# Patient Record
Sex: MALE | Race: BLACK OR AFRICAN AMERICAN | URBAN - METROPOLITAN AREA
[De-identification: names, ages, dates, MRNs, and addresses within clinical notes are randomized per-mention and may not be internally consistent; named-entity substitution may affect disease eponyms.]

---

## 2017-02-01 ENCOUNTER — APPOINTMENT (OUTPATIENT)
Dept: CT IMAGING | Facility: CLINIC | Age: 27
End: 2017-02-01
Attending: FAMILY MEDICINE
Payer: COMMERCIAL

## 2017-02-01 ENCOUNTER — HOSPITAL ENCOUNTER (EMERGENCY)
Facility: CLINIC | Age: 27
Discharge: HOME | End: 2017-02-01
Attending: FAMILY MEDICINE | Admitting: FAMILY MEDICINE
Payer: COMMERCIAL

## 2017-02-01 VITALS
OXYGEN SATURATION: 98 % | SYSTOLIC BLOOD PRESSURE: 116 MMHG | RESPIRATION RATE: 20 BRPM | HEART RATE: 58 BPM | TEMPERATURE: 97.8 F | DIASTOLIC BLOOD PRESSURE: 76 MMHG

## 2017-02-01 DIAGNOSIS — R10.11 ABDOMINAL PAIN, RIGHT UPPER QUADRANT: ICD-10-CM

## 2017-02-01 DIAGNOSIS — K52.9 ILEITIS: ICD-10-CM

## 2017-02-01 LAB
ALBUMIN SERPL-MCNC: 3.9 G/DL (ref 3.4–5)
ALBUMIN UR-MCNC: NEGATIVE MG/DL
ALP SERPL-CCNC: 50 U/L (ref 40–150)
ALT SERPL W P-5'-P-CCNC: 17 U/L (ref 0–70)
ANION GAP SERPL CALCULATED.3IONS-SCNC: 6 MMOL/L (ref 3–14)
APPEARANCE UR: CLEAR
AST SERPL W P-5'-P-CCNC: 13 U/L (ref 0–45)
BASOPHILS # BLD AUTO: 0.1 10E9/L (ref 0–0.2)
BASOPHILS NFR BLD AUTO: 1.1 %
BILIRUB SERPL-MCNC: 1.3 MG/DL (ref 0.2–1.3)
BILIRUB UR QL STRIP: NEGATIVE
BUN SERPL-MCNC: 19 MG/DL (ref 7–30)
CALCIUM SERPL-MCNC: 8.6 MG/DL (ref 8.5–10.1)
CHLORIDE SERPL-SCNC: 108 MMOL/L (ref 94–109)
CO2 SERPL-SCNC: 27 MMOL/L (ref 20–32)
COLOR UR AUTO: YELLOW
CREAT SERPL-MCNC: 0.6 MG/DL (ref 0.66–1.25)
DIFFERENTIAL METHOD BLD: ABNORMAL
EOSINOPHIL # BLD AUTO: 1.5 10E9/L (ref 0–0.7)
EOSINOPHIL NFR BLD AUTO: 24.1 %
ERYTHROCYTE [DISTWIDTH] IN BLOOD BY AUTOMATED COUNT: 12 % (ref 10–15)
GFR SERPL CREATININE-BSD FRML MDRD: ABNORMAL ML/MIN/1.7M2
GLUCOSE SERPL-MCNC: 90 MG/DL (ref 70–99)
GLUCOSE UR STRIP-MCNC: NEGATIVE MG/DL
HCT VFR BLD AUTO: 46 % (ref 40–53)
HGB BLD-MCNC: 16.7 G/DL (ref 13.3–17.7)
HGB UR QL STRIP: NEGATIVE
IMM GRANULOCYTES # BLD: 0 10E9/L (ref 0–0.4)
IMM GRANULOCYTES NFR BLD: 0 %
KETONES UR STRIP-MCNC: NEGATIVE MG/DL
LEUKOCYTE ESTERASE UR QL STRIP: NEGATIVE
LIPASE SERPL-CCNC: 101 U/L (ref 73–393)
LYMPHOCYTES # BLD AUTO: 1.5 10E9/L (ref 0.8–5.3)
LYMPHOCYTES NFR BLD AUTO: 23.8 %
MCH RBC QN AUTO: 32.7 PG (ref 26.5–33)
MCHC RBC AUTO-ENTMCNC: 36.3 G/DL (ref 31.5–36.5)
MCV RBC AUTO: 90 FL (ref 78–100)
MONOCYTES # BLD AUTO: 0.4 10E9/L (ref 0–1.3)
MONOCYTES NFR BLD AUTO: 7 %
MUCOUS THREADS #/AREA URNS LPF: PRESENT /LPF
NEUTROPHILS # BLD AUTO: 2.8 10E9/L (ref 1.6–8.3)
NEUTROPHILS NFR BLD AUTO: 44 %
NITRATE UR QL: NEGATIVE
NRBC # BLD AUTO: 0 10*3/UL
NRBC BLD AUTO-RTO: 0 /100
PH UR STRIP: 7 PH (ref 5–7)
PLATELET # BLD AUTO: 204 10E9/L (ref 150–450)
POTASSIUM SERPL-SCNC: 3.7 MMOL/L (ref 3.4–5.3)
PROT SERPL-MCNC: 7.2 G/DL (ref 6.8–8.8)
RBC # BLD AUTO: 5.11 10E12/L (ref 4.4–5.9)
RBC #/AREA URNS AUTO: 1 /HPF (ref 0–2)
SODIUM SERPL-SCNC: 141 MMOL/L (ref 133–144)
SP GR UR STRIP: 1.02 (ref 1–1.03)
URN SPEC COLLECT METH UR: ABNORMAL
UROBILINOGEN UR STRIP-MCNC: NORMAL MG/DL (ref 0–2)
WBC # BLD AUTO: 6.3 10E9/L (ref 4–11)
WBC #/AREA URNS AUTO: <1 /HPF (ref 0–2)

## 2017-02-01 PROCEDURE — 25000128 H RX IP 250 OP 636: Performed by: FAMILY MEDICINE

## 2017-02-01 PROCEDURE — 74176 CT ABD & PELVIS W/O CONTRAST: CPT

## 2017-02-01 PROCEDURE — 96374 THER/PROPH/DIAG INJ IV PUSH: CPT | Mod: 59 | Performed by: FAMILY MEDICINE

## 2017-02-01 PROCEDURE — 99284 EMERGENCY DEPT VISIT MOD MDM: CPT | Mod: 25 | Performed by: FAMILY MEDICINE

## 2017-02-01 PROCEDURE — 99284 EMERGENCY DEPT VISIT MOD MDM: CPT | Mod: Z6 | Performed by: FAMILY MEDICINE

## 2017-02-01 PROCEDURE — 85025 COMPLETE CBC W/AUTO DIFF WBC: CPT | Performed by: FAMILY MEDICINE

## 2017-02-01 PROCEDURE — 80053 COMPREHEN METABOLIC PANEL: CPT | Performed by: FAMILY MEDICINE

## 2017-02-01 PROCEDURE — 81001 URINALYSIS AUTO W/SCOPE: CPT | Performed by: FAMILY MEDICINE

## 2017-02-01 PROCEDURE — 83690 ASSAY OF LIPASE: CPT | Performed by: FAMILY MEDICINE

## 2017-02-01 RX ORDER — NAPROXEN 500 MG/1
500 TABLET ORAL 2 TIMES DAILY WITH MEALS
Qty: 16 TABLET | Refills: 0 | Status: SHIPPED | OUTPATIENT
Start: 2017-02-01 | End: 2017-02-09

## 2017-02-01 RX ORDER — POLYETHYLENE GLYCOL 3350 17 G/17G
1 POWDER, FOR SOLUTION ORAL DAILY
Qty: 527 G | Refills: 0 | Status: SHIPPED | OUTPATIENT
Start: 2017-02-01 | End: 2017-03-03

## 2017-02-01 RX ORDER — KETOROLAC TROMETHAMINE 30 MG/ML
30 INJECTION, SOLUTION INTRAMUSCULAR; INTRAVENOUS ONCE
Status: COMPLETED | OUTPATIENT
Start: 2017-02-01 | End: 2017-02-01

## 2017-02-01 RX ADMIN — KETOROLAC TROMETHAMINE 30 MG: 30 INJECTION, SOLUTION INTRAMUSCULAR at 09:43

## 2017-02-01 NOTE — ED AVS SNAPSHOT
[facility], Emergency Department    [address]    [facility] [place] [address]    Phone:  [phone]    Fax:  [phone]                                       Palmira Chavis   MRN: [id number]    Department:  [facility], Emergency Department   Date of Visit:  2/1/2017           After Visit Summary Signature Page     I have received my discharge instructions, and my questions have been answered. I have discussed any challenges I see with this plan with the nurse or doctor.    ..........................................................................................................................................  Patient/Patient Representative Signature      ..........................................................................................................................................  Patient Representative Print Name and Relationship to Patient    ..................................................               ................................................  Date                                            Time    ..........................................................................................................................................  Reviewed by Signature/Title    ...................................................              ..............................................  Date                                                            Time

## 2017-02-01 NOTE — ED AVS SNAPSHOT
[facility], Emergency Department    [address]    [place] [address]    Phone:  [phone]    Fax:  [phone]                                       Palmira Chavis   MRN: [id number]    Department:  [facility], Emergency Department   Date of Visit:  2/1/2017           Patient Information     Date Of Birth          1990        Your diagnoses for this visit were:     Abdominal pain, right upper quadrant     Ileitis        You were seen by Marc Leal MD.        Discharge Instructions       Thank you for choosing [facility].     Please closely monitor for further symptoms. Return to the Emergency Department if you develop any new or worsening signs or symptoms.    If you received any opiate pain medications or sedatives during your visit, please do not drive for at least 8 hours.     Labs, cultures or final xray interpretations may still need to be reviewed.  We will call you if your plan of care needs to be changed.    P    Abdominal Pain  Abdominal pain is pain in the stomach or intestinal area. Everyone has this pain from time to time. In many cases it goes away on its own. But abdominal pain can sometimes be due to a serious problem, such as appendicitis. So it s important to know when to seek help.  Causes of abdominal pain  There are many possible causes of abdominal pain. Common causes in adults include:    Constipation, diarrhea, or gas    GERD (gastroesophageal reflux disease) movement of stomach acid into the esophagus, also known as acid reflux or heartburn    Peptic ulcer (a sore in the lining of the stomach or small intestine)    Inflammation of the gallbladder, liver, or pancreas    Gallstones or kidney stones    Appendicitis     Obstruction of the intestines     Hernia (bulging of an internal organ through a muscle or other tissue)    Urinary tract infections    In women, menstrual cramps, fibroids, or endometriosis of the uterus    Inflammation  or infection of the intestines  Diagnosing the cause of abdominal pain  Your health care provider will examine you to help find the cause of your pain. If needed, tests will be ordered. Because abdominal pain has so many possible causes, it can be hard to discover the reason for the pain. Giving details about your pain can help. Be ready to tell your health care provider where and when you feel the pain and what makes it better or worse. Also mention whether you have other symptoms such as fever, tiredness, nausea, vomiting, or changes in bathroom habits.  Treating abdominal pain  Certain causes of pain, such as appendicitis or a bowel obstruction, need emergency treatment. Other problems can be treated with rest, fluids, or medications. Your health care provider can give you specific instructions for treatment or self-care based on the cause of your pain.  If you have vomiting or diarrhea, sip water or other clear fluids. When you are ready to eat solid foods again, start with small amounts of easy-to-digest, low-fat foods, such as applesauce, toast, or crackers.   When to call the doctor  Call 911 or go to the hospital right away if you:    Can t pass stool and are vomiting    Are vomiting blood or have black, tarry diarrhea    Also have chest, neck, or shoulder pain    Feel like you are about to pass out    Have pain in your shoulder blades with nausea    Have sudden, excruciating abdominal pain    Have new, severe pain unlike any you have felt before    Have a belly that is rigid, hard, and tender to touch  Call your doctor if you have:    Pain for more than 5 days    Bloating for more than 2 days    Diarrhea for more than 5 days    Fever of 101 F (38.3 C) or higher    Pain that continues to worsen    Unexplained weight loss    Continued lack of appetite    Blood in the stool  How to prevent abdominal pain  Here are some tips to help prevent abdominal pain:    Eat smaller amounts of food at one time.    Avoid  greasy, fried, or other high-fat foods.    Avoid foods that give you gas.    Exercise regularly.    Drink plenty of fluids.  To help prevent symptoms of gastroesophageal reflux disease (GERD):    Quit smoking.    Reduce alcohol and certain foods that increase stomach acid.     Lose excess weight.    Finish eating at least 2 hours before you go to bed or lie down.    Elevate the head of your bed.    8602-2544 [employer]. [address] [place] [address], [place] [address]. All rights reserved. This information is not intended as a substitute for professional medical care. Always follow your healthcare professional's instructions.      lucian make an appointment to follow up with [facility] (phone: [phone]) in 5-7 days if not resolved.    24 Hour Appointment Hotline       To make an appointment at any [facility], call [phone] ([phone]). If you don't have a family doctor or clinic, we will help you find one. [facility] clinics are conveniently located to serve the needs of you and your family.             Review of your medicines      START taking        Dose / Directions Last dose taken    naproxen 500 MG tablet   Commonly known as:  NAPROSYN   Dose:  500 mg   Quantity:  16 tablet        Take 1 tablet (500 mg) by mouth 2 times daily (with meals) for 8 days   Refills:  0        polyethylene glycol powder   Commonly known as:  MIRALAX   Dose:  1 capful   Quantity:  527 g        Take 17 g (1 capful) by mouth daily   Refills:  0          Our records show that you are taking the medicines listed below. If these are incorrect, please call your family doctor or clinic.        Dose / Directions Last dose taken    acetaminophen 325 MG tablet   Commonly known as:  TYLENOL   Dose:  650 mg   Quantity:  100 tablet        Take 2 tablets (650 mg) by mouth every 4 hours as needed for mild pain   Refills:  0        docusate sodium 100 MG tablet   Commonly known as:  COLACE   Dose:  100  "mg   Quantity:  60 tablet        Take 100 mg by mouth 2 times daily Stop taking if diarrhea develops   Refills:  1                Prescriptions were sent or printed at these locations (2 Prescriptions)                   Other Prescriptions                Printed at Department/Unit printer (2 of 2)         naproxen (NAPROSYN) 500 MG tablet               polyethylene glycol (MIRALAX) powder                Procedures and tests performed during your visit     CBC with platelets differential    CT Abdomen Pelvis w/o Contrast    Comprehensive metabolic panel    Lipase    UA with Microscopic reflex to Culture      Orders Needing Specimen Collection     None      Pending Results     No orders found from 2017 to 2017.            Pending Culture Results     No orders found from 2017 to 2017.            Thank you for choosing [facility]       Thank you for choosing [facility] for your care. Our goal is always to provide you with excellent care. Hearing back from our patients is one way we can continue to improve our services. Please take a few minutes to complete the written survey that you may receive in the mail after you visit with us. Thank you!        [employer] Information     [employer] lets you send messages to your doctor, view your test results, renew your prescriptions, schedule appointments and more. To sign up, go to [email or web address] . Click on \"Log in\" on the left side of the screen, which will take you to the Welcome page. Then click on \"Sign up Now\" on the right side of the page.     You will be asked to enter the access code listed below, as well as some personal information. Please follow the directions to create your username and password.     Your access code is: [email or web address]  Expires: 2017 11:42 AM     Your access code will  in 90 days. If you need help or a new code, please call your [facility] clinic or [phone].        Care EveryWhere ID     This is your Care EveryWhere ID. " This could be used by other organizations to access your [facility] medical records  [id number]        After Visit Summary       This is your record. Keep this with you and show to your community pharmacist(s) and doctor(s) at your next visit.

## 2017-02-01 NOTE — DISCHARGE INSTRUCTIONS
Thank you for choosing [facility].     Please closely monitor for further symptoms. Return to the Emergency Department if you develop any new or worsening signs or symptoms.    If you received any opiate pain medications or sedatives during your visit, please do not drive for at least 8 hours.     Labs, cultures or final xray interpretations may still need to be reviewed.  We will call you if your plan of care needs to be changed.    P    Abdominal Pain  Abdominal pain is pain in the stomach or intestinal area. Everyone has this pain from time to time. In many cases it goes away on its own. But abdominal pain can sometimes be due to a serious problem, such as appendicitis. So it s important to know when to seek help.  Causes of abdominal pain  There are many possible causes of abdominal pain. Common causes in adults include:    Constipation, diarrhea, or gas    GERD (gastroesophageal reflux disease) movement of stomach acid into the esophagus, also known as acid reflux or heartburn    Peptic ulcer (a sore in the lining of the stomach or small intestine)    Inflammation of the gallbladder, liver, or pancreas    Gallstones or kidney stones    Appendicitis     Obstruction of the intestines     Hernia (bulging of an internal organ through a muscle or other tissue)    Urinary tract infections    In women, menstrual cramps, fibroids, or endometriosis of the uterus    Inflammation or infection of the intestines  Diagnosing the cause of abdominal pain  Your health care provider will examine you to help find the cause of your pain. If needed, tests will be ordered. Because abdominal pain has so many possible causes, it can be hard to discover the reason for the pain. Giving details about your pain can help. Be ready to tell your health care provider where and when you feel the pain and what makes it better or worse. Also mention whether you have other symptoms such as fever, tiredness, nausea,  vomiting, or changes in bathroom habits.  Treating abdominal pain  Certain causes of pain, such as appendicitis or a bowel obstruction, need emergency treatment. Other problems can be treated with rest, fluids, or medications. Your health care provider can give you specific instructions for treatment or self-care based on the cause of your pain.  If you have vomiting or diarrhea, sip water or other clear fluids. When you are ready to eat solid foods again, start with small amounts of easy-to-digest, low-fat foods, such as applesauce, toast, or crackers.   When to call the doctor  Call 911 or go to the hospital right away if you:    Can t pass stool and are vomiting    Are vomiting blood or have black, tarry diarrhea    Also have chest, neck, or shoulder pain    Feel like you are about to pass out    Have pain in your shoulder blades with nausea    Have sudden, excruciating abdominal pain    Have new, severe pain unlike any you have felt before    Have a belly that is rigid, hard, and tender to touch  Call your doctor if you have:    Pain for more than 5 days    Bloating for more than 2 days    Diarrhea for more than 5 days    Fever of 101 F (38.3 C) or higher    Pain that continues to worsen    Unexplained weight loss    Continued lack of appetite    Blood in the stool  How to prevent abdominal pain  Here are some tips to help prevent abdominal pain:    Eat smaller amounts of food at one time.    Avoid greasy, fried, or other high-fat foods.    Avoid foods that give you gas.    Exercise regularly.    Drink plenty of fluids.  To help prevent symptoms of gastroesophageal reflux disease (GERD):    Quit smoking.    Reduce alcohol and certain foods that increase stomach acid.     Lose excess weight.    Finish eating at least 2 hours before you go to bed or lie down.    Elevate the head of your bed.    2924-3625 [employer]. [address] [place] [address], [place] [address]. All rights reserved. This information is  not intended as a substitute for professional medical care. Always follow your healthcare professional's instructions.      lucian make an appointment to follow up with [facility] (phone: [phone]) in 5-7 days if not resolved.

## 2017-02-01 NOTE — ED NOTES
Pt complaining of right upper quadrant crushing pain that is not relieve by first aide medication given to him by his friends yesterday. Pt unable to recall the name of the medication.

## 2017-02-01 NOTE — ED PROVIDER NOTES
History     Chief Complaint   Patient presents with     Abdominal Pain     C/oright upper quadrant abdominal pain that started around 6 pm yesterday.     JARROD Chavis is a 26 year old male who presents with right-sided abdominal pain.  Patient states last night around 6 PM he developed a sharp intermittent pain in the right upper abdomen.  The pain is nonradiating.  Nothing makes the pain better or worse.  There is no associated nausea, or vomiting.  No diarrhea or constipation.  No urinary symptoms, no groin pain, no testicular pain.  At this moment he does not have the pain.  No fever or chills.  He is status post appendectomy last year.  No postoperative problems.    I have reviewed the Medications, Allergies, Past Medical and Surgical History, and Social History in the Epic system.    Review of Systems  All other systems reviewed and were negative    Physical Exam   BP: 110/72 mmHg  Pulse: 58  Temp: 97.8  F (36.6  C)  Resp: 16  SpO2: 100 %  Physical Exam   Constitutional: He is oriented to person, place, and time. No distress.   HENT:   Head: Atraumatic.   Mouth/Throat: Oropharynx is clear and moist. No oropharyngeal exudate.   Eyes: Pupils are equal, round, and reactive to light. No scleral icterus.   Neck: Neck supple.   Cardiovascular: Normal heart sounds and intact distal pulses.    Pulmonary/Chest: Breath sounds normal. No respiratory distress.   Abdominal: Soft. Bowel sounds are normal. He exhibits no mass. There is no tenderness. There is no rebound and no guarding.   Musculoskeletal: He exhibits no edema or tenderness.   Neurological: He is alert and oriented to person, place, and time.   Skin: Skin is warm. No rash noted. He is not diaphoretic.       ED Course     Procedures             Critical Care time:  none               Labs Ordered and Resulted from Time of ED Arrival Up to the Time of Departure from the ED   CBC WITH PLATELETS DIFFERENTIAL - Abnormal; Notable for the following:      Absolute Eosinophils 1.5 (*)     All other components within normal limits   COMPREHENSIVE METABOLIC PANEL - Abnormal; Notable for the following:     Creatinine 0.60 (*)     All other components within normal limits   ROUTINE UA WITH MICROSCOPIC REFLEX TO CULTURE - Abnormal; Notable for the following:     Mucous Urine Present (*)     All other components within normal limits   LIPASE       Assessments & Plan (with Medical Decision Making)   Patient with no medical history other than prior appendectomy presenting with right-sided abdominal pain.  Initial differential diagnosis included but was not restricted to cholelithiasis or cholecystitis, pancreatitis, ischemic bowel, diverticulitis, ureterolithiasis,small bowel obstruction, pyelonephritis, abdominal compartment syndrome, referred pain as well as other life threatening and non-life-threatening  causes of right lower abdominal pain.he has a nonsurgical exam and labs but complained of persistent right-sided tenderness.  Ultimately a CT was obtained which showed question of possible ileitis.  He has no known history of inflammatory bowel disease and certainly has no associated diarrhea or hematochezia.  His symptoms did improve with Toradol, and he now is only minimal tenderness.  I advised him regarding his CT findings and the need for follow-up.  He would like to be discharged and understands indications for return.      I have reviewed the nursing notes.    I have reviewed the findings, diagnosis, plan and need for follow up with the patient.    Discharge Medication List as of 2/1/2017 11:42 AM      START taking these medications    Details   naproxen (NAPROSYN) 500 MG tablet Take 1 tablet (500 mg) by mouth 2 times daily (with meals) for 8 days, Disp-16 tablet, R-0, Local Print      polyethylene glycol (MIRALAX) powder Take 17 g (1 capful) by mouth daily, Disp-527 g, R-0, Local Print             Final diagnoses:   Abdominal pain, right upper quadrant   Ileitis        2/1/2017   [facility], [place], EMERGENCY DEPARTMENT      Marc Leal MD  02/01/17 [id number]

## 2017-12-12 ENCOUNTER — OFFICE VISIT (OUTPATIENT)
Dept: FAMILY MEDICINE | Facility: CLINIC | Age: 27
End: 2017-12-12

## 2017-12-12 VITALS
OXYGEN SATURATION: 99 % | HEART RATE: 60 BPM | DIASTOLIC BLOOD PRESSURE: 71 MMHG | SYSTOLIC BLOOD PRESSURE: 107 MMHG | RESPIRATION RATE: 18 BRPM | HEIGHT: 67 IN | TEMPERATURE: 97.4 F | BODY MASS INDEX: 20.72 KG/M2 | WEIGHT: 132 LBS

## 2017-12-12 DIAGNOSIS — K06.8 BLEEDING GUMS: ICD-10-CM

## 2017-12-12 DIAGNOSIS — Z71.84 COUNSELING FOR TRAVEL: ICD-10-CM

## 2017-12-12 DIAGNOSIS — J18.9 PNEUMONIA OF RIGHT LOWER LOBE DUE TO INFECTIOUS ORGANISM: Primary | ICD-10-CM

## 2017-12-12 RX ORDER — GUAIFENESIN/DEXTROMETHORPHAN 100-10MG/5
5 SYRUP ORAL EVERY 4 HOURS PRN
Qty: 120 ML | Refills: 1 | Status: SHIPPED | OUTPATIENT
Start: 2017-12-12

## 2017-12-12 RX ORDER — IBUPROFEN 200 MG
400 TABLET ORAL EVERY 6 HOURS PRN
Qty: 60 TABLET | Refills: 1 | Status: SHIPPED | OUTPATIENT
Start: 2017-12-12

## 2017-12-12 RX ORDER — IBUPROFEN 600 MG/1
600 TABLET, FILM COATED ORAL
COMMUNITY
Start: 2017-06-09

## 2017-12-12 RX ORDER — ACETAMINOPHEN 500 MG
500-1000 TABLET ORAL
COMMUNITY
Start: 2017-06-09

## 2017-12-12 ASSESSMENT — ENCOUNTER SYMPTOMS
DIZZINESS: 0
FREQUENCY: 0
DYSPHORIC MOOD: 0
ARTHRALGIAS: 0
ABDOMINAL PAIN: 0
SORE THROAT: 1
CHEST TIGHTNESS: 0
WHEEZING: 0
RHINORRHEA: 0
PALPITATIONS: 0
WEAKNESS: 0
CHILLS: 1
FATIGUE: 1
APPETITE CHANGE: 0
NAUSEA: 0
BRUISES/BLEEDS EASILY: 0
COUGH: 1
DYSURIA: 0
DIAPHORESIS: 1
ADENOPATHY: 0
SHORTNESS OF BREATH: 0
UNEXPECTED WEIGHT CHANGE: 0
VOMITING: 0
CONSTIPATION: 0
NUMBNESS: 0
DIARRHEA: 0
JOINT SWELLING: 0
POLYDIPSIA: 0
HEADACHES: 1
MYALGIAS: 0

## 2017-12-12 NOTE — PATIENT INSTRUCTIONS
Here is the plan from today's visit    1. Pneumonia of right lower lobe due to infectious organism (H)  - guaiFENesin-dextromethorphan (ROBITUSSIN DM) 100-10 MG/5ML syrup; Take 5 mLs by mouth every 4 hours as needed for cough  Dispense: 120 mL; Refill: 1  - ibuprofen (ADVIL/MOTRIN) 200 MG tablet; Take 2 tablets (400 mg) by mouth every 6 hours as needed for pain  Dispense: 60 tablet; Refill: 1    Please call or return to clinic if your symptoms don't go away    Thank you for coming to [facility] today.  Lab Testing:  **If you had lab testing today and your results are reassuring or normal they will be mailed to you or sent through [employer] within 7 days.   **If the lab tests need quick action we will call you with the results.  The phone number we will call with results is # [phone] (home) . If this is not the best number please call our clinic and change the number.  Medication Refills:  If you need any refills please call your pharmacy and they will contact us.   If you need to  your refill at a new pharmacy, please contact the new pharmacy directly. The new pharmacy will help you get your medications transferred faster.   Scheduling:  If you have any concerns about today's visit or wish to schedule another appointment please call our office during normal business hours [phone] (8-5:00 M-F)  If a referral was made to a [facility] and you don't get a call from central scheduling please call [phone].  If a Mammogram was ordered for you at [facility] call [phone] to schedule or change your appointment.  If you had an XRay/CT/Ultrasound/MRI ordered the number is [phone] to schedule or change your radiology appointment.   Medical Concerns:  If you have urgent medical concerns please call [phone] at any time of the day.

## 2017-12-12 NOTE — PROGRESS NOTES
"      HPI:       Palmira Chavis is a 27 year old who presents for the following  Patient presents with:  Cough: Coughing,  mucus, white   Establish Care: New Patient       Acute Illness   Concerns: Coughing  When did it start? 12/2/17  Is it getting better, worse or staying the same?  Staying the same, worse at night    Fatigue/Achiness?: No     Fever?: Some, subjective, not measured    Chills/Sweats?: Yes  Headache (location?) YES Entire head, moderate pain - has tried ibuprofen but it doesn't help    Sinus Pressure?:No     Eye redness/Discharge?: No     Ear Pain?: No     Runny nose?: No     Congestion?: No   Sore Throat?:  YES While coughing  Respiratory  Cough?:  YES-productive of white sputum, no blood, no SOB, no pain with cough or deep breathing    Wheeze?: No   GI/    Decreased Appetite?: No     Nausea?:No     Vomiting?: No     Diarrhea?:  No     Dysuria/Frequency?.:No     Any Illness Exposure?: No     Any foreign travel or contact with anyone ill who travelled abroad? No     Therapies Tried and outcome: Levofloxacin 5 days - completed full course of antibiotics, ibuprofen    Seen at [facility] on 12/5 and brings paperwork. Lactate <1.8. CBC and electrolytes WNL. CXR with \"subtle peribronchial ground-glass opacities within the right lower lobe\", treated with levofloxacin for suspected pneumonia. Lives alone. No sick contacts.    Concern: Anticipated travel to hospitals   Description of the problem :Would like medications for travel.         Concern: Gums bleed with brushing   Description of the problem :Chronic problem. Unchanged from baseline. Does not floss. Does not see a dentist regularly.     An Serbian  was used for  this visit.      Problem, Medication and Allergy Lists were reviewed and are current.  Patient is a new patient to this clinic and so I reviewed the Past Medical History, the Family History and the Social History.          Review of Systems:   Review of Systems   Constitutional: " "Positive for chills, diaphoresis and fatigue. Negative for appetite change and unexpected weight change.   HENT: Positive for sore throat. Negative for congestion, ear pain, hearing loss, mouth sores and rhinorrhea.    Eyes: Negative for visual disturbance.   Respiratory: Positive for cough. Negative for chest tightness, shortness of breath and wheezing.    Cardiovascular: Negative for chest pain, palpitations and leg swelling.   Gastrointestinal: Negative for abdominal pain, constipation, diarrhea, nausea and vomiting.   Endocrine: Negative for polydipsia and polyuria.   Genitourinary: Negative for dysuria, frequency and urgency.   Musculoskeletal: Negative for arthralgias, joint swelling and myalgias.   Skin: Negative for rash.   Neurological: Positive for headaches. Negative for dizziness, weakness and numbness.   Hematological: Negative for adenopathy. Does not bruise/bleed easily.   Psychiatric/Behavioral: Negative for behavioral problems and dysphoric mood.             Physical Exam:   Patient Vitals for the past 24 hrs:   BP Temp Temp src Pulse Resp SpO2 Height Weight   12/12/17 1001 107/71 97.4  F (36.3  C) Oral 60 18 99 % 5' 6.5\" (168.9 cm) 132 lb (59.9 kg)     Body mass index is 20.99 kg/(m^2).  Vitals were reviewed and were normal     Physical Exam  General: Alert, pleasant, no acute distress.  Head: Normocephalic, atraumatic.  Eyes: Mild conjunctival injection bilaterally. Pupils equal, round, and symmetrically reactive to light bilaterally. Extraocular movements intact.  Nose: Normal without discharge.  Ears: Tympanic membrane intact with normal light reflex bilaterally, no effusions.  Mouth: Moist mucus membranes, pharynx without erythema.  Neck: Supple, no prominent cervical, submental, submandibular, tonsillar, or supraclavicular lymphadenopathy.  CV: Regular rate and rhythm, normal S1 and S2, no murmurs appreciated.  Respiratory: Normal respiratory effort. Lungs clear to auscultation bilaterally " without wheezes, rales, or rhonchi.  Psych: Normal affect, good eye contact.      Results:     None    Assessment and Plan     1. Pneumonia of right lower lobe due to infectious organism (H)  No focal findings on lung exam and normal vital signs without tachypnea or hypoxemia make acute process less likely. Symptoms likely due to resolving pneumonia diagnosed at outside institution. Discussed with patient timeline for resolution of symptoms after completing antibiotic treatment for pneumonia. Patient endorsed understanding. Will prescribe cough suppressant and recommend continued treatment of aches and pains with tylenol/ibuprofen. Patient requested prescription for ibuprofen.  - guaiFENesin-dextromethorphan (ROBITUSSIN DM) 100-10 MG/5ML syrup; Take 5 mLs by mouth every 4 hours as needed for cough  Dispense: 120 mL; Refill: 1  - ibuprofen (ADVIL/MOTRIN) 200 MG tablet; Take 2 tablets (400 mg) by mouth every 6 hours as needed for pain  Dispense: 60 tablet; Refill: 1    2. Gum bleeding  Provided contact information for dentistry.    3. Anticipated travel overseas  Provided contact information for travel clinics.      Options for treatment and follow-up care were reviewed with the patient. Palmira Chavis  engaged in the decision making process and verbalized understanding of the options discussed and agreed with the final plan.    Nanette Nuñez  MS3  P:[phone]    I, Nanette Nuñez, MS3, am acting as the scribe for Gabo Thompson MD. All aspects of the exam and documentation were approved by the attending physician.    The medical student acted as scribe and the encounter documented above was completely performed by myself and the documentation reflects the work I have performed today. MD Gabo Mccormick MD

## 2017-12-12 NOTE — MR AVS SNAPSHOT
After Visit Summary   12/12/2017    Palmira Chavis    MRN: [id number]           Patient Information     Date Of Birth          1990        Visit Information        Provider Department      12/12/2017 10:00 AM Gabo Thompson MD [facility]        Today's Diagnoses     Pneumonia of right lower lobe due to infectious organism (H)    -  1      Care Instructions    Here is the plan from today's visit    1. Pneumonia of right lower lobe due to infectious organism (H)  - guaiFENesin-dextromethorphan (ROBITUSSIN DM) 100-10 MG/5ML syrup; Take 5 mLs by mouth every 4 hours as needed for cough  Dispense: 120 mL; Refill: 1  - ibuprofen (ADVIL/MOTRIN) 200 MG tablet; Take 2 tablets (400 mg) by mouth every 6 hours as needed for pain  Dispense: 60 tablet; Refill: 1    Please call or return to clinic if your symptoms don't go away    Thank you for coming to [facility] today.  Lab Testing:  **If you had lab testing today and your results are reassuring or normal they will be mailed to you or sent through Spreadknowledge within 7 days.   **If the lab tests need quick action we will call you with the results.  The phone number we will call with results is # [phone] (home) . If this is not the best number please call our clinic and change the number.  Medication Refills:  If you need any refills please call your pharmacy and they will contact us.   If you need to  your refill at a new pharmacy, please contact the new pharmacy directly. The new pharmacy will help you get your medications transferred faster.   Scheduling:  If you have any concerns about today's visit or wish to schedule another appointment please call our office during normal business hours [phone] (8-5:00 M-F)  If a referral was made to a [facility] and you don't get a call from central scheduling please call [phone].  If a Mammogram was ordered for you at [facility] call  "[phone] to schedule or change your appointment.  If you had an XRay/CT/Ultrasound/MRI ordered the number is [phone] to schedule or change your radiology appointment.   Medical Concerns:  If you have urgent medical concerns please call [phone] at any time of the day.            Follow-ups after your visit        Who to contact     Please call your clinic at [phone] to:    Ask questions about your health    Make or cancel appointments    Discuss your medicines    Learn about your test results    Speak to your doctor   If you have compliments or concerns about an experience at your clinic, or if you wish to file a complaint, please contact [facility] Patient Relations at [phone] or email us at [email or web address]         Additional Information About Your Visit        [employer] Information     [email or web address] is an electronic gateway that provides easy, online access to your medical records. With [email or web address], you can request a clinic appointment, read your test results, renew a prescription or communicate with your care team.     To sign up for [email or web address] visit the website at [email or web address]   You will be asked to enter the access code listed below, as well as some personal information. Please follow the directions to create your username and password.     Your access code is: [email or web address]  Expires: 3/12/2018 10:51 AM     Your access code will  in 90 days. If you need help or a new code, please contact your [facility] or call [phone] for assistance.        Care EveryWhere ID     This is your Care EveryWhere ID. This could be used by other organizations to access your [place] medical records  [id number]        Your Vitals Were     Pulse Temperature Respirations Height Pulse Oximetry BMI (Body Mass Index)    60 97.4  F (36.3  C) (Oral) 18 5' 6.5\" (168.9 cm) 99% 20.99 kg/m2       Blood Pressure from Last 3 Encounters: "   12/12/17 107/71   02/01/17 116/76   11/21/16 109/71    Weight from Last 3 Encounters:   12/12/17 132 lb (59.9 kg)   11/21/16 130 lb (59 kg)   10/31/16 124 lb 11.2 oz (56.6 kg)              Today, you had the following     No orders found for display         Today's Medication Changes          These changes are accurate as of: 12/12/17 10:51 AM.  If you have any questions, ask your nurse or doctor.               Start taking these medicines.        Dose/Directions    guaiFENesin-dextromethorphan 100-10 MG/5ML syrup   Commonly known as:  ROBITUSSIN DM   Used for:  Pneumonia of right lower lobe due to infectious organism (H)   Started by:  Gabo Thompson MD        Dose:  5 mL   Take 5 mLs by mouth every 4 hours as needed for cough   Quantity:  120 mL   Refills:  1         These medicines have changed or have updated prescriptions.        Dose/Directions    * ibuprofen 600 MG tablet   Commonly known as:  ADVIL/MOTRIN   This may have changed:  Another medication with the same name was added. Make sure you understand how and when to take each.   Changed by:  Gabo Thompson MD        Dose:  600 mg   Take 600 mg by mouth   Refills:  0       * ibuprofen 200 MG tablet   Commonly known as:  ADVIL/MOTRIN   This may have changed:  You were already taking a medication with the same name, and this prescription was added. Make sure you understand how and when to take each.   Used for:  Pneumonia of right lower lobe due to infectious organism (H)   Changed by:  Gabo Thompson MD        Dose:  400 mg   Take 2 tablets (400 mg) by mouth every 6 hours as needed for pain   Quantity:  60 tablet   Refills:  1       * Notice:  This list has 2 medication(s) that are the same as other medications prescribed for you. Read the directions carefully, and ask your doctor or other care provider to review them with you.         Where to get your medicines      These medications were sent to [facility] [place], [place] -  [address] [facility]  [address] [facility] [address]     Phone:  [phone]     guaiFENesin-dextromethorphan 100-10 MG/5ML syrup    ibuprofen 200 MG tablet                Primary Care Provider Fax #    Physician No Ref-Primary [phone]       No address on file        Equal Access to Services     ANGEL ENAMORADOLORENZO : Hadii aad ku hadasho Soomaali, waaxda luqadaha, qaybta kaalmada adehilarioyada, andressa yadyin hayaan chitrahilario vallejo shanda fu. So [facility] [phone].    ATENCIÓN: Si habla español, tiene a rosenbaum disposición servicios gratuitos de asistencia lingüística. [facility] [phone].    We comply with applicable federal civil rights laws and [place] laws. We do not discriminate on the basis of race, color, national origin, age, disability, sex, sexual orientation, or gender identity.            Thank you!     Thank you for choosing [facility]  for your care. Our goal is always to provide you with excellent care. Hearing back from our patients is one way we can continue to improve our services. Please take a few minutes to complete the written survey that you may receive in the mail after your visit with us. Thank you!             Your Updated Medication List - Protect others around you: Learn how to safely use, store and throw away your medicines at www.disposemymeds.org.          This list is accurate as of: 12/12/17 10:51 AM.  Always use your most recent med list.                   Brand Name Dispense Instructions for use Diagnosis    * acetaminophen 325 MG tablet    TYLENOL    100 tablet    Take 2 tablets (650 mg) by mouth every 4 hours as needed for mild pain    Appendicitis, unspecified appendicitis type       * acetaminophen 500 MG tablet    TYLENOL     Take 500-1,000 mg by mouth        docusate sodium 100 MG tablet    COLACE    60 tablet    Take 100 mg by mouth 2 times daily Stop taking if diarrhea develops    Appendicitis, unspecified appendicitis type       guaiFENesin-dextromethorphan 100-10  MG/5ML syrup    ROBITUSSIN DM    120 mL    Take 5 mLs by mouth every 4 hours as needed for cough    Pneumonia of right lower lobe due to infectious organism (H)       * ibuprofen 600 MG tablet    ADVIL/MOTRIN     Take 600 mg by mouth        * ibuprofen 200 MG tablet    ADVIL/MOTRIN    60 tablet    Take 2 tablets (400 mg) by mouth every 6 hours as needed for pain    Pneumonia of right lower lobe due to infectious organism (H)       * Notice:  This list has 4 medication(s) that are the same as other medications prescribed for you. Read the directions carefully, and ask your doctor or other care provider to review them with you.